# Patient Record
Sex: FEMALE | Race: WHITE | NOT HISPANIC OR LATINO | URBAN - METROPOLITAN AREA
[De-identification: names, ages, dates, MRNs, and addresses within clinical notes are randomized per-mention and may not be internally consistent; named-entity substitution may affect disease eponyms.]

---
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
## 2022-09-13 ENCOUNTER — OFFICE VISIT (OUTPATIENT)
Dept: OTOLARYNGOLOGY | Facility: CLINIC | Age: 22
End: 2022-09-13
Payer: COMMERCIAL

## 2022-09-13 VITALS
TEMPERATURE: 98.6 F | BODY MASS INDEX: 24.84 KG/M2 | DIASTOLIC BLOOD PRESSURE: 68 MMHG | WEIGHT: 135 LBS | SYSTOLIC BLOOD PRESSURE: 106 MMHG | HEIGHT: 62 IN

## 2022-09-13 DIAGNOSIS — H60.8X3 CHRONIC REACTIVE OTITIS EXTERNA OF BOTH EARS: ICD-10-CM

## 2022-09-13 DIAGNOSIS — H92.02 OTALGIA OF LEFT EAR: ICD-10-CM

## 2022-09-13 DIAGNOSIS — M26.609 TMJ (TEMPOROMANDIBULAR JOINT SYNDROME): ICD-10-CM

## 2022-09-13 DIAGNOSIS — H61.22 LEFT EAR IMPACTED CERUMEN: Primary | ICD-10-CM

## 2022-09-13 PROCEDURE — 99203 OFFICE O/P NEW LOW 30 MIN: CPT | Performed by: NURSE PRACTITIONER

## 2022-09-13 PROCEDURE — 69210 REMOVE IMPACTED EAR WAX UNI: CPT | Performed by: NURSE PRACTITIONER

## 2022-09-13 NOTE — PROGRESS NOTES
Assessment/Plan:    Left ear impacted cerumen    On exam noted left cerumen impaction and unable to fully view tympanic membrane  Cerumen impaction removed left eac with suction, pt tolerated procedure well  Noted cerumen mixed with low grade otitis externa  Upon removal, improved hearing and decreased clogged sensation of bilateral ears  Discussed routine cerumen care including avoidance of q-tips  Recommend use of ear drops to left eac for 10 days for low grade otitis externa  Hydrocortisone cream pea sized amount on finger as needed for itching in ears  Audiogram if symptoms worsen  Otalgia of left ear  On exam, left cerumen impaction removed with low grade otitis externa  Discomfort along tragus with malocclusion jaw, clicking of left TMJ  Reviewed recurrent left otalgia and possible causes  Discussed ETD, dental changes, TMJ, vs ear processes  TMJ syndrome - soft diet, jaw rest, NSAIDs, warm compresses, massage, and discuss otalgia with orthodontist    Recommend watchful monitoring of symptoms and reoccurrence  Diagnoses and all orders for this visit:    Left ear impacted cerumen    Chronic reactive otitis externa of both ears  -     Discontinue: neomycin-polymyxin-hydrocortisone (CORTISPORIN) otic solution; Administer 4 drops into the left ear 2 (two) times a day  -     neomycin-polymyxin-hydrocortisone (CORTISPORIN) otic solution; Administer 4 drops into the left ear 2 (two) times a day for 10 days    TMJ (temporomandibular joint syndrome)    Otalgia of left ear    Other orders  -     Ear cerumen removal          Subjective:      Patient ID: Felix Becker is a 25 y o  female  Presents today as a new patient due to ear concerns  July 2022 had ear infection  Treated twice since then antibiotics  Crackling sensation persists in ear  Sounds like in bubble, dull hearing  Otalgia, sharp pain worse with touch  Increased ear wax, no otorrhea  Ears itch  Uses q-tip often    No history ear surgery  Recurrent ear infections as child  The following portions of the patient's history were reviewed and updated as appropriate: allergies, current medications, past family history, past medical history, past social history, past surgical history and problem list     Review of Systems   Constitutional: Negative  HENT: Positive for ear pain  Negative for congestion, ear discharge, hearing loss, nosebleeds, postnasal drip, rhinorrhea, sinus pressure, sinus pain, sore throat, tinnitus and voice change  Eyes: Negative  Respiratory: Negative for chest tightness and shortness of breath  Cardiovascular: Negative  Gastrointestinal: Negative  Endocrine: Negative  Musculoskeletal: Negative  Skin: Negative for color change  Neurological: Negative for dizziness, numbness and headaches  Psychiatric/Behavioral: Negative  Objective:      /68 (BP Location: Left arm, Patient Position: Sitting, Cuff Size: Adult)   Temp 98 6 °F (37 °C) (Temporal)   Ht 5' 2" (1 575 m)   Wt 61 2 kg (135 lb)   BMI 24 69 kg/m²            Physical Exam  Constitutional:       Appearance: She is well-developed  HENT:      Head: Normocephalic  Right Ear: Hearing, tympanic membrane, ear canal and external ear normal  No decreased hearing noted  No drainage or tenderness  Tympanic membrane is not perforated or erythematous  Left Ear: Hearing, tympanic membrane, ear canal and external ear normal  No decreased hearing noted  No drainage or tenderness  There is impacted cerumen  Tympanic membrane is not perforated or erythematous  Nose: Nose normal  No nasal deformity or septal deviation  Mouth/Throat:      Mouth: Mucous membranes are not pale and not dry  No oral lesions  Dentition: Normal dentition  Pharynx: Uvula midline  No oropharyngeal exudate  Neck:      Trachea: No tracheal deviation  Cardiovascular:      Rate and Rhythm: Normal rate     Pulmonary: Effort: Pulmonary effort is normal  No accessory muscle usage or respiratory distress  Musculoskeletal:      Right shoulder: Normal range of motion  Cervical back: Full passive range of motion without pain, normal range of motion and neck supple  Lymphadenopathy:      Cervical: No cervical adenopathy  Skin:     General: Skin is warm and dry  Neurological:      Mental Status: She is alert and oriented to person, place, and time  Cranial Nerves: No cranial nerve deficit  Sensory: No sensory deficit  Psychiatric:         Behavior: Behavior is cooperative  Ear cerumen removal    Date/Time: 9/13/2022 3:45 PM  Performed by: ELZBIETA Noel  Authorized by: ELZBIETA Noel   Universal Protocol:  Consent: Verbal consent obtained  Risks and benefits: risks, benefits and alternatives were discussed  Consent given by: patient  Patient understanding: patient states understanding of the procedure being performed      Patient location:  Clinic  Procedure details:     Local anesthetic:  None    Location:  L ear    Approach:  External  Post-procedure details:     Complication:  None    Hearing quality:  Normal    Patient tolerance of procedure:   Tolerated well, no immediate complications

## 2022-09-13 NOTE — ASSESSMENT & PLAN NOTE
On exam, left cerumen impaction removed with low grade otitis externa  Discomfort along tragus with malocclusion jaw, clicking of left TMJ  Reviewed recurrent left otalgia and possible causes  Discussed ETD, dental changes, TMJ, vs ear processes  TMJ syndrome - soft diet, jaw rest, NSAIDs, warm compresses, massage, and discuss otalgia with orthodontist    Recommend watchful monitoring of symptoms and reoccurrence

## 2022-09-13 NOTE — ASSESSMENT & PLAN NOTE
On exam noted left cerumen impaction and unable to fully view tympanic membrane  Cerumen impaction removed left eac with suction, pt tolerated procedure well  Noted cerumen mixed with low grade otitis externa  Upon removal, improved hearing and decreased clogged sensation of bilateral ears  Discussed routine cerumen care including avoidance of q-tips  Recommend use of ear drops to left eac for 10 days for low grade otitis externa  Hydrocortisone cream pea sized amount on finger as needed for itching in ears  Audiogram if symptoms worsen

## 2022-09-13 NOTE — PATIENT INSTRUCTIONS
Hydrocortisone cream to both ears at bedtime for 30 days for itching ears      Ear drops as prescribed to left ear for 10 days